# Patient Record
Sex: MALE | Race: WHITE | HISPANIC OR LATINO | Employment: FULL TIME | URBAN - METROPOLITAN AREA
[De-identification: names, ages, dates, MRNs, and addresses within clinical notes are randomized per-mention and may not be internally consistent; named-entity substitution may affect disease eponyms.]

---

## 2017-03-23 ENCOUNTER — HOSPITAL ENCOUNTER (EMERGENCY)
Facility: HOSPITAL | Age: 32
Discharge: HOME | End: 2017-03-24
Attending: EMERGENCY MEDICINE

## 2017-03-23 DIAGNOSIS — R31.9 HEMATURIA: Primary | ICD-10-CM

## 2017-03-23 DIAGNOSIS — N20.0 KIDNEY STONE: ICD-10-CM

## 2017-03-23 LAB
ANION GAP SERPL CALC-SCNC: 12 MMOL/L
BACTERIA #/AREA URNS HPF: ABNORMAL /HPF
BASOPHILS # BLD AUTO: 0.02 K/UL
BASOPHILS NFR BLD: 0.2 %
BILIRUB UR QL STRIP: NEGATIVE
BUN SERPL-MCNC: 14 MG/DL
CALCIUM SERPL-MCNC: 9.7 MG/DL
CHLORIDE SERPL-SCNC: 101 MMOL/L
CLARITY UR: ABNORMAL
CO2 SERPL-SCNC: 24 MMOL/L
COLOR UR: ABNORMAL
CREAT SERPL-MCNC: 1.1 MG/DL
DIFFERENTIAL METHOD: ABNORMAL
EOSINOPHIL # BLD AUTO: 0.3 K/UL
EOSINOPHIL NFR BLD: 4 %
ERYTHROCYTE [DISTWIDTH] IN BLOOD BY AUTOMATED COUNT: 12.5 %
EST. GFR  (AFRICAN AMERICAN): >60 ML/MIN/1.73 M^2
EST. GFR  (NON AFRICAN AMERICAN): >60 ML/MIN/1.73 M^2
GLUCOSE SERPL-MCNC: 132 MG/DL
GLUCOSE UR QL STRIP: NEGATIVE
HCT VFR BLD AUTO: 43.5 %
HGB BLD-MCNC: 15.4 G/DL
HGB UR QL STRIP: ABNORMAL
HYALINE CASTS #/AREA URNS LPF: 0 /LPF
KETONES UR QL STRIP: NEGATIVE
LEUKOCYTE ESTERASE UR QL STRIP: NEGATIVE
LYMPHOCYTES # BLD AUTO: 1.7 K/UL
LYMPHOCYTES NFR BLD: 20.9 %
MCH RBC QN AUTO: 28.7 PG
MCHC RBC AUTO-ENTMCNC: 35.4 %
MCV RBC AUTO: 81 FL
MICROSCOPIC COMMENT: ABNORMAL
MONOCYTES # BLD AUTO: 0.5 K/UL
MONOCYTES NFR BLD: 6.6 %
NEUTROPHILS # BLD AUTO: 5.6 K/UL
NEUTROPHILS NFR BLD: 68.1 %
NITRITE UR QL STRIP: NEGATIVE
PH UR STRIP: 5 [PH] (ref 5–8)
PLATELET # BLD AUTO: 179 K/UL
PMV BLD AUTO: 11.1 FL
POTASSIUM SERPL-SCNC: 4 MMOL/L
PROT UR QL STRIP: ABNORMAL
RBC # BLD AUTO: 5.37 M/UL
RBC #/AREA URNS HPF: >100 /HPF (ref 0–4)
SODIUM SERPL-SCNC: 137 MMOL/L
SP GR UR STRIP: 1.02 (ref 1–1.03)
SQUAMOUS #/AREA URNS HPF: 1 /HPF
URN SPEC COLLECT METH UR: ABNORMAL
UROBILINOGEN UR STRIP-ACNC: NEGATIVE EU/DL
WBC # BLD AUTO: 8.17 K/UL
WBC #/AREA URNS HPF: 2 /HPF (ref 0–5)

## 2017-03-23 PROCEDURE — 81000 URINALYSIS NONAUTO W/SCOPE: CPT

## 2017-03-23 PROCEDURE — 85025 COMPLETE CBC W/AUTO DIFF WBC: CPT

## 2017-03-23 PROCEDURE — 96375 TX/PRO/DX INJ NEW DRUG ADDON: CPT

## 2017-03-23 PROCEDURE — 63600175 PHARM REV CODE 636 W HCPCS: Performed by: EMERGENCY MEDICINE

## 2017-03-23 PROCEDURE — 80048 BASIC METABOLIC PNL TOTAL CA: CPT

## 2017-03-23 PROCEDURE — 96361 HYDRATE IV INFUSION ADD-ON: CPT

## 2017-03-23 PROCEDURE — 25000003 PHARM REV CODE 250: Performed by: EMERGENCY MEDICINE

## 2017-03-23 PROCEDURE — 99285 EMERGENCY DEPT VISIT HI MDM: CPT | Mod: 25

## 2017-03-23 PROCEDURE — 96374 THER/PROPH/DIAG INJ IV PUSH: CPT

## 2017-03-23 PROCEDURE — 87086 URINE CULTURE/COLONY COUNT: CPT

## 2017-03-23 RX ORDER — TAMSULOSIN HYDROCHLORIDE 0.4 MG/1
0.4 CAPSULE ORAL DAILY
Qty: 30 CAPSULE | Refills: 1 | Status: SHIPPED | OUTPATIENT
Start: 2017-03-23 | End: 2018-03-23

## 2017-03-23 RX ORDER — OXYCODONE AND ACETAMINOPHEN 5; 325 MG/1; MG/1
1 TABLET ORAL EVERY 4 HOURS PRN
Qty: 20 TABLET | Refills: 0 | Status: SHIPPED | OUTPATIENT
Start: 2017-03-23

## 2017-03-23 RX ORDER — TAMSULOSIN HYDROCHLORIDE 0.4 MG/1
0.4 CAPSULE ORAL
Status: COMPLETED | OUTPATIENT
Start: 2017-03-23 | End: 2017-03-23

## 2017-03-23 RX ORDER — MORPHINE SULFATE 2 MG/ML
6 INJECTION, SOLUTION INTRAMUSCULAR; INTRAVENOUS
Status: COMPLETED | OUTPATIENT
Start: 2017-03-23 | End: 2017-03-23

## 2017-03-23 RX ORDER — KETOROLAC TROMETHAMINE 30 MG/ML
15 INJECTION, SOLUTION INTRAMUSCULAR; INTRAVENOUS
Status: COMPLETED | OUTPATIENT
Start: 2017-03-23 | End: 2017-03-23

## 2017-03-23 RX ORDER — ONDANSETRON 4 MG/1
4 TABLET, FILM COATED ORAL 3 TIMES DAILY PRN
Qty: 20 TABLET | Refills: 1 | Status: SHIPPED | OUTPATIENT
Start: 2017-03-23

## 2017-03-23 RX ORDER — PHENAZOPYRIDINE HYDROCHLORIDE 100 MG/1
100 TABLET, FILM COATED ORAL
Qty: 20 TABLET | Refills: 0 | Status: SHIPPED | OUTPATIENT
Start: 2017-03-23 | End: 2017-04-02

## 2017-03-23 RX ADMIN — SODIUM CHLORIDE 1000 ML: 0.9 INJECTION, SOLUTION INTRAVENOUS at 09:03

## 2017-03-23 RX ADMIN — MORPHINE SULFATE 6 MG: 2 INJECTION, SOLUTION INTRAMUSCULAR; INTRAVENOUS at 11:03

## 2017-03-23 RX ADMIN — KETOROLAC TROMETHAMINE 15 MG: 30 INJECTION, SOLUTION INTRAMUSCULAR at 09:03

## 2017-03-23 RX ADMIN — TAMSULOSIN HYDROCHLORIDE 0.4 MG: 0.4 CAPSULE ORAL at 11:03

## 2017-03-23 NOTE — ED AVS SNAPSHOT
[facility]  [address]  [place] [address]               Candace Menard   3/23/2017  9:25 PM   ED    Descripción:  Male : 1985   [facility]           Suarez Cuidado fue coordinado por:     Provider Role From To    Berkley Muñiz MD Attending Provider 17 3826 --      Razón de la kassie     Flank Pain           Diagnósticos de Esta Visita        Comentarios    Hematuria    -  Primario     Kidney stone           ED Disposition     ED Disposition Condition Comment    Discharge             Lista de tareas           Información de seguimiento     Realice un seguimiento con:  Giovanni Zee MD    Cómo:  Helio haresh kassie lo antes posible    Especialidad:  Urology    Información de contacto:    [address]  [address]  [place] [address]  [phone]        Recetas para recoger        Disp Refills Start End    oxycodone-acetaminophen (PERCOCET) 5-325 mg per tablet 20 tablet 0 3/23/2017     Take 1 tablet by mouth every 4 (four) hours as needed for Pain. - Oral    ondansetron (ZOFRAN) 4 MG tablet 20 tablet 1 3/23/2017     Take 1 tablet (4 mg total) by mouth 3 (three) times daily as needed for Nausea. - Oral    tamsulosin (FLOMAX) 0.4 mg Cp24 30 capsule 1 3/23/2017 3/23/2018    Take 1 capsule (0.4 mg total) by mouth once daily. - Oral    phenazopyridine (PYRIDIUM) 100 MG tablet 20 tablet 0 3/23/2017 2017    Take 1 tablet (100 mg total) by mouth 3 (three) times daily with meals. - Oral      [facility]     [facility]ermeras - Asistencia   Enfermeras registradas de [facility] pueden ayudarle a reservar haresh kassie, proveer educación para la thomas, asesoría clínica, y otros servicios de asesoramiento.   Llame para roscoe servicio gratuito a [phone].             Medicamentos           Mensaje sobre Medicamentos     Verificar los cambios y / o adiciones a suarez régimen de medicación son los mismos que discutir con  cagle médico. Si cualquiera de estos cambios o adiciones son incorrectos, por favor notifique a cagle proveedor de atención médica.        EMPEZAR a [place] estos medicamentos NUEVOS        Refills    oxycodone-acetaminophen (PERCOCET) 5-325 mg per tablet 0    Sig: Take 1 tablet by mouth every 4 (four) hours as needed for Pain.    Categoría: Print    Vía: Oral    ondansetron (ZOFRAN) 4 MG tablet 1    Sig: Take 1 tablet (4 mg total) by mouth 3 (three) times daily as needed for Nausea.    Categoría: Print    Vía: Oral    tamsulosin (FLOMAX) 0.4 mg Cp24 1    Sig: Take 1 capsule (0.4 mg total) by mouth once daily.    Categoría: Print    Vía: Oral    phenazopyridine (PYRIDIUM) 100 MG tablet 0    Sig: Take 1 tablet (100 mg total) by mouth 3 (three) times daily with meals.    Categoría: Print    Vía: Oral      These medications were administered today        Dose Freq    sodium chloride 0.9% bolus 1,000 mL 1,000 mL ED 1 Time    Sig: Inject 1,000 mLs into the vein ED 1 Time.    Categoría: Normal    Vía: Intravenous    ketorolac injection 15 mg 15 mg ED 1 Time    Sig: Inject 15 mg into the vein ED 1 Time.    Categoría: Normal    Vía: Intravenous    tamsulosin 24 hr capsule 0.4 mg 0.4 mg ED 1 Time    Sig: Take 1 capsule (0.4 mg total) by mouth ED 1 Time.    Categoría: Normal    Vía: Oral    morphine injection 6 mg 6 mg ED 1 Time    Sig: Inject 3 mLs (6 mg total) into the vein ED 1 Time.    Categoría: Normal    Vía: Intravenous           Verifique que la siguiente lista de medicamentos es haresh representación exacta de los medicamentos que está tomando actualmente. Si no hay ningunos reportados, la lista puede estar en johnson. Si no es correcta, por favor póngase en contacto con cagle proveedor de atención médica. Lleve esta lista con usted en julio de emergencia.           Medicamentos Actuales     morphine injection 6 mg Inject 3 mLs (6 mg total) into the vein ED 1 Time.    ondansetron (ZOFRAN) 4 MG tablet Take 1 tablet (4 mg total) by  "mouth 3 (three) times daily as needed for Nausea.    oxycodone-acetaminophen (PERCOCET) 5-325 mg per tablet Take 1 tablet by mouth every 4 (four) hours as needed for Pain.    phenazopyridine (PYRIDIUM) 100 MG tablet Take 1 tablet (100 mg total) by mouth 3 (three) times daily with meals.    tamsulosin (FLOMAX) 0.4 mg Cp24 Take 1 capsule (0.4 mg total) by mouth once daily.    tamsulosin 24 hr capsule 0.4 mg Take 1 capsule (0.4 mg total) by mouth ED 1 Time.           Información de referencia clínica           Saira signos vitales filiberto     PS Pulso Temperatura Resp [place] Peso    141/99 (Patient Position: Sitting) 81 98.3 °F (36.8 °C) (Oral) 20 5' 10" (1.778 m) 72.6 kg (160 lb)    SpO2 [place]                98% 22.96 kg/m2          Alergias     A partir del:  3/23/2017        No Known Allergies      Vacunas     Administradas en la fecha de la visita:  3/23/2017        None      ED Micro, Lab, POCT     Start Ordered       Status Ordering Provider    03/23/17 2144 03/23/17 2144  Basic metabolic panel  STAT      Final result     03/23/17 2144 03/23/17 2144  Urinalysis Clean Catch  STAT      Final result     03/23/17 2144 03/23/17 2144  CBC auto differential  STAT      Final result     03/23/17 2144 03/23/17 2144  Urine culture **CANNOT BE ORDERED STAT**  Once      In process     03/23/17 2144 03/23/17 2144  Urinalysis Microscopic  Once      Final result       ED Imaging Orders     Start Ordered       Status Ordering Provider    03/23/17 2149 03/23/17 2148  CT Renal Stone Study ABD Pelvis WO  1 time imaging      Final result         Instrucciones a igor de tamika         Cálculo Renal [Kidney Stone W/ Colic]    Rosalia maurice retorcijón (cólico [cramping]) que usted siente, así nereida las náuseas (nausea) y el vómito (vomiting) que tiene se deben a haresh pequeña shandra (cálculo [stone]) que se le formó en el riñón y que ahora está pasando por un conducto angosto (el uréter), mora hacia la vejiga. Haresh vez que llegue a la vejiga, el " "dolor se detendrá. Es posible que el cálculo (shandra [stone]) pase entero por el tracto urinario. [El tamaño puede ser de entre 1/16" y 1/4" (1 y 6 mm)]. También puede ser que la shandra se desgrane en fragmentos arenosos de los que usted quizás ni siquiera se dé cuenta.  Cuando se ha tenido un cálculo renal (kidney stone), hay probabilidades de que otro vuelva a formarse en el futuro.  Cuidados En [place]:  1. Stephanie gran cantidad de agua (al menos, entre 8 y 10 vasos al día).  2. La mayoría de los cálculos (piedras [stones]) salen solas, zaire pueden tardar desde algunas horas a algunos días en hacerlo. Hay veces en que la shandra es demasiado amne para salir waldemar y, entonces, hay que recurrir a métodos especiales para quitarla.  3. Cada vez que orine, hágalo en un recipiente (jarra o algo similar). Vierta la orina de dorothy recipiente en el inodoro pasándola por un colador. Hágalo así hasta que hayan pasado 24 horas después de que haya cesado el dolor. Para entonces, si había un cálculo renal (shandra en el riñón [kidney stone]), ya debería price salido de la vejiga. Algunas piedras se disuelven en partículas que parecen arena y atraviesan el colador sin que usted lo note. Si eso sucede, no verá ninguna shandra.  4. Si encuentra haresh shandra en el colador, guárdela y llévesela al médico para que la analice. Hay algunos tipos de shandra cuya formación se puede prevenir. Por lo tanto, es importante saber qué tipo de shandar es la que usted tiene.  5. Intente mantenerse lo más activo posible, dado que eso ayudará a expulsar la shandra. No se quede en la cama a menos que el dolor le impida estar levantado. Quizás note que la orina es de color rojiza, rosada o amarronada. Es normal cuando se está despidiendo un cálculo renal (kidney stone).  Programe haresh VISITA DE CONTROL con cagle médico o regrese a roscoe centro si el dolor dura más de 48 horas.  [NOTA: Si le dumont hecho haresh radiografía (X-ray) o haresh tomografía computarizada (CT scan), " éstas serán evaluadas por un especialista. Le informarán de los nuevos hallazgos que puedan afectar la atención médica que necesita.]  Busque Prontamente Atención Médica  si algo de lo siguiente ocurre:  · El dolor no disminuye con el medicamento que le dieron.  · Vómito persistente o no puede mantener los líquidos en el estómago.  · Debilidad, mareo o desmayo.  · Fiebre de 100.4°F (38°C) o más tamika, o nereida le haya indicado cagle proveedor de atención médica.  · Orina opaca de color rojizo o amarronada (no se puede darian a través de keisha) u orina que tiene muchos coágulos de ariadne (blood clots).  · No ha podido orinar en 8 horas y siente haresh presión en la vejiga que es cada vez mayor.     Date Last Reviewed: 1/10/2014  © 8874-5169 [employer]. [address] [place] [address]. Todos los derechos reservados. Esta información no pretende sustituir la atención médica profesional. Sólo cagle médico puede diagnosticar y tratar un problema de thomas.          Registrarse para [facility]     La activación de cagle cuenta [facility] es tan fácil nereida 1-2-3!    1) Ir a [email or web address], seleccione Registrarse Ahora, meter el código de activación y cagle fecha de nacimiento, y seleccione Próximo.    [id number]  Expires: 5/7/2017 11:00 PM      2) Crear un nombre de usuario y contraseña para usar cuando se visita [facility] en el futuro y selecciona haresh pregunta de seguridad en julio de que pierda cagle contraseña y seleccione Próximo.    3) Introduzca cagle dirección de correo electrónico y cece clic en Registrarse!    Información Adicional  Si tiene alguna pregunta, por favor, e-mail [email or web address] o llame al [phone] para hablar con nuestro personal. Recuerde, [facility] no debe ser usada para necesidades urgentes. En julio de emergencia médica, llame al 911.        Smoking Cessation     Si desea dejar de fumar:  · Usted puede ser elegible para recibir servicios gratuitos si usted es un residente de [place] y  comenzó a fumar cigarrillos antes del 1988. Llame al Smoking Cessation Trust (SCT) a [phone] o [phone].   Llame -QUIT-NOW si usted no cumple con los criterios anteriores.             [facility] cumple con las leyes federales aplicables de derechos civiles y no discrimina por motivos de traci, color, origen nacional, edad, discapacidad, o sexo.        Language Assistance Services     ATTENTION: Language assistance services are available, free of charge. Please call [phone].      ATENCIÓN: Si habla español, tiene a cagle disposición servicios gratuitos de asistencia lingüística. Llame al [phone].     CHÚ Ý: N?u b?n nói Ti?ng Vi?t, có các d?ch v? h? tr? ngôn ng? mi?n phí dành cho b?n. G?i s? [phone].                      [facility]  [address]  [place] [address]               Candace Menard   3/23/2017  9:25 PM   ED    Description:  Male : 1985   Department:  [facility]           Your Care was Coordinated By:     Provider Role From To    Berkley Muñiz MD Attending Provider 17 0024 --      Reason for Visit     Flank Pain           Diagnoses this Visit        Comments    Hematuria    -  Primary     Kidney stone           ED Disposition     ED Disposition Condition Comment    Discharge             To Do List           Follow-up Information     Follow up with Giovanni Zee MD. Schedule an appointment as soon as possible for a visit.    Specialty:  Urology    Contact information:    [address]  [address]  [place] [address] [phone]         These Medications        Disp Refills Start End    oxycodone-acetaminophen (PERCOCET) 5-325 mg per tablet 20 tablet 0 3/23/2017     Take 1 tablet by mouth every 4 (four) hours as needed for Pain. - Oral    ondansetron (ZOFRAN) 4 MG tablet 20 tablet 1 3/23/2017     Take 1 tablet (4 mg total) by mouth 3 (three) times daily as  needed for Nausea. - Oral    tamsulosin (FLOMAX) 0.4 mg Cp24 30 capsule 1 3/23/2017 3/23/2018    Take 1 capsule (0.4 mg total) by mouth once daily. - Oral    phenazopyridine (PYRIDIUM) 100 MG tablet 20 tablet 0 3/23/2017 4/2/2017    Take 1 tablet (100 mg total) by mouth 3 (three) times daily with meals. - Oral      [facility]     [facility] On Call Nurse Care Line - 24/7 Assistance  Registered nurses in the [facility] On Call Center provide clinical advisement, health education, appointment booking, and other advisory services.  Call for this free service at [phone].             Medications           Message regarding Medications     Verify the changes and/or additions to your medication regime listed below are the same as discussed with your clinician today.  If any of these changes or additions are incorrect, please notify your healthcare provider.        START taking these NEW medications        Refills    oxycodone-acetaminophen (PERCOCET) 5-325 mg per tablet 0    Sig: Take 1 tablet by mouth every 4 (four) hours as needed for Pain.    Class: Print    Route: Oral    ondansetron (ZOFRAN) 4 MG tablet 1    Sig: Take 1 tablet (4 mg total) by mouth 3 (three) times daily as needed for Nausea.    Class: Print    Route: Oral    tamsulosin (FLOMAX) 0.4 mg Cp24 1    Sig: Take 1 capsule (0.4 mg total) by mouth once daily.    Class: Print    Route: Oral    phenazopyridine (PYRIDIUM) 100 MG tablet 0    Sig: Take 1 tablet (100 mg total) by mouth 3 (three) times daily with meals.    Class: Print    Route: Oral      These medications were administered today        Dose Freq    sodium chloride 0.9% bolus 1,000 mL 1,000 mL ED 1 Time    Sig: Inject 1,000 mLs into the vein ED 1 Time.    Class: Normal    Route: Intravenous    ketorolac injection 15 mg 15 mg ED 1 Time    Sig: Inject 15 mg into the vein ED 1 Time.    Class: Normal    Route: Intravenous    tamsulosin 24 hr capsule 0.4 mg 0.4 mg ED 1 Time    Sig: Take 1 capsule  "(0.4 mg total) by mouth ED 1 Time.    Class: Normal    Route: Oral    morphine injection 6 mg 6 mg ED 1 Time    Sig: Inject 3 mLs (6 mg total) into the vein ED 1 Time.    Class: Normal    Route: Intravenous           Verify that the below list of medications is an accurate representation of the medications you are currently taking.  If none reported, the list may be blank. If incorrect, please contact your healthcare provider. Carry this list with you in case of emergency.           Current Medications     morphine injection 6 mg Inject 3 mLs (6 mg total) into the vein ED 1 Time.    ondansetron (ZOFRAN) 4 MG tablet Take 1 tablet (4 mg total) by mouth 3 (three) times daily as needed for Nausea.    oxycodone-acetaminophen (PERCOCET) 5-325 mg per tablet Take 1 tablet by mouth every 4 (four) hours as needed for Pain.    phenazopyridine (PYRIDIUM) 100 MG tablet Take 1 tablet (100 mg total) by mouth 3 (three) times daily with meals.    tamsulosin (FLOMAX) 0.4 mg Cp24 Take 1 capsule (0.4 mg total) by mouth once daily.    tamsulosin 24 hr capsule 0.4 mg Take 1 capsule (0.4 mg total) by mouth ED 1 Time.           Clinical Reference Information           Your Vitals Were     BP Pulse Temp Resp Height Weight    141/99 (Patient Position: Sitting) 81 98.3 °F (36.8 °C) (Oral) 20 5' 10" (1.778 m) 72.6 kg (160 lb)    SpO2 BMI                98% 22.96 kg/m2          Allergies as of 3/23/2017     No Known Allergies      Immunizations Administered on Date of Encounter - 3/23/2017     None      ED Micro, Lab, POCT     Start Ordered       Status Ordering Provider    03/23/17 2144 03/23/17 2144  Basic metabolic panel  STAT      Final result     03/23/17 2144 03/23/17 2144  Urinalysis Clean Catch  STAT      Final result     03/23/17 2144 03/23/17 2144  CBC auto differential  STAT      Final result     03/23/17 2144 03/23/17 2144  Urine culture **CANNOT BE ORDERED STAT**  Once      In process     03/23/17 2144 03/23/17 2144  Urinalysis " "Microscopic  Once      Final result       ED Imaging Orders     Start Ordered       Status Ordering Provider    03/23/17 2149 03/23/17 2148  CT Renal Stone Study ABD Pelvis WO  1 time imaging      Final result         Discharge Instructions         Cálculo Renal [Kidney Stone W/ Colic]    Dorothy maurice retorcijón (cólico [cramping]) que usted siente, así nereida las náuseas (nausea) y el vómito (vomiting) que tiene se deben a haresh pequeña shandra (cálculo [stone]) que se le formó en el riñón y que ahora está pasando por un conducto angosto (el uréter), mora hacia la vejiga. Haresh vez que llegue a la vejiga, el dolor se detendrá. Es posible que el cálculo (shandra [stone]) pase entero por el tracto urinario. [El tamaño puede ser de entre 1/16" y 1/4" (1 y 6 mm)]. También puede ser que la shandra se desgrane en fragmentos arenosos de los que usted quizás ni siquiera se dé cuenta.  Cuando se ha tenido un cálculo renal (kidney stone), hay probabilidades de que otro vuelva a formarse en el futuro.  Cuidados En [place]:  1. Stephanie gran cantidad de agua (al menos, entre 8 y 10 vasos al día).  2. La mayoría de los cálculos (piedras [stones]) salen solas, zaire pueden tardar desde algunas horas a algunos días en hacerlo. Hay veces en que la shandra es demasiado mane para salir waldemar y, entonces, hay que recurrir a métodos especiales para quitarla.  3. Cada vez que orine, hágalo en un recipiente (jarra o algo similar). Vierta la orina de dorothy recipiente en el inodoro pasándola por un colador. Hágalo así hasta que hayan pasado 24 horas después de que haya cesado el dolor. Para entonces, si había un cálculo renal (shandra en el riñón [kidney stone]), ya debería price salido de la vejiga. Algunas piedras se disuelven en partículas que parecen arena y atraviesan el colador sin que usted lo note. Si eso sucede, no verá ninguna shandra.  4. Si encuentra haresh shandra en el colador, guárdela y llévesela al médico para que la analice. Hay algunos tipos " de shandra cuya formación se puede prevenir. Por lo tanto, es importante saber qué tipo de shandra es la que usted tiene.  5. Intente mantenerse lo más activo posible, dado que eso ayudará a expulsar la shandra. No se quede en la cama a menos que el dolor le impida estar levantado. Quizás note que la orina es de color rojiza, rosada o amarronada. Es normal cuando se está despidiendo un cálculo renal (kidney stone).  Programe haresh VISITA DE CONTROL con cagle médico o regrese a roscoe centro si el dolor dura más de 48 horas.  [NOTA: Si le dumont hecho haresh radiografía (X-ray) o haresh tomografía computarizada (CT scan), éstas serán evaluadas por un especialista. Le informarán de los nuevos hallazgos que puedan afectar la atención médica que necesita.]  Busque Prontamente Atención Médica  si algo de lo siguiente ocurre:  · El dolor no disminuye con el medicamento que le dieron.  · Vómito persistente o no puede mantener los líquidos en el estómago.  · Debilidad, mareo o desmayo.  · Fiebre de 100.4°F (38°C) o más tamika, o nereida le haya indicado cagle proveedor de atención médica.  · Orina opaca de color rojizo o amarronada (no se puede darian a través de keisha) u orina que tiene muchos coágulos de ariadne (blood clots).  · No ha podido orinar en 8 horas y siente haresh presión en la vejiga que es cada vez mayor.     Date Last Reviewed: 1/10/2014  © 0662-0055 [employer]. [address] [place] [address], [place] [address]. Todos los derechos reservados. Esta información no pretende sustituir la atención médica profesional. Sólo cagle médico puede diagnosticar y tratar un problema de thomas.          [facility] Sign-Up     Activating your [facility] account is as easy as 1-2-3!     1) Visit [email or web address], select Sign Up Now, enter this activation code and your date of birth, then select Next.  [email or web address]  Expires: 5/7/2017 11:00 PM      2) Create a username and password to use when you visit [facility] in the future and select a security  question in case you lose your password and select Next.    3) Enter your e-mail address and click Sign Up!    Additional Information  If you have questions, please e-mail [email or web address] or call [phone] to talk to our [facility] staff. Remember, [facility] is NOT to be used for urgent needs. For medical emergencies, dial 911.         Smoking Cessation     If you would like to quit smoking:   You may be eligible for free services if you are a [place] resident and started smoking cigarettes before September 1, 1988.  Call the Smoking Cessation Trust (SCT) toll free at (239) [phone] or [phone] [phone].   Call 0-693-QUIT-NOW if you do not meet the above criteria.             [facility] complies with applicable Federal civil rights laws and does not discriminate on the basis of race, color, national origin, age, disability, or sex.        Language Assistance Services     ATTENTION: Language assistance services are available, free of charge. Please call [phone].      ATENCIÓN: Si habla español, tiene a cagle disposición servicios gratuitos de asistencia lingüística. Llame al [phone].     CHÚ Ý: N?u b?n nói Ti?ng Vi?t, có các d?ch v? h? tr? ngôn ng? mi?n phí dành cho b?n. G?i s? [phone].

## 2017-03-24 VITALS
HEART RATE: 70 BPM | DIASTOLIC BLOOD PRESSURE: 69 MMHG | TEMPERATURE: 98 F | WEIGHT: 160 LBS | OXYGEN SATURATION: 99 % | HEIGHT: 70 IN | BODY MASS INDEX: 22.9 KG/M2 | SYSTOLIC BLOOD PRESSURE: 121 MMHG | RESPIRATION RATE: 16 BRPM

## 2017-03-24 NOTE — ED NOTES
Patient complaints of lower back pain for 4-5 days which has radiated to right groin area and right flank pain since 4 this afternoon.  Pain has gotten unbearable and they have decided to come to ED.  Patient appears in distress.  No nausea, no vomiting.  Pain is 10/10.

## 2017-03-24 NOTE — ED PROVIDER NOTES
Encounter Date: 3/23/2017       History     Chief Complaint   Patient presents with    Flank Pain     pt to triage and reports sudden right flank pain w/ blood in urine and began yesterday     Review of patient's allergies indicates:  No Known Allergies  HPI Comments: 32-year-old male with no past medical history presents with sudden onset right flank pain with blood in her urine that started yesterday pain started about 4 hours ago.  Aching throbbing radiates to the groin.  8 out of 10.  No associated shortness of breath or chest pain.  He does have some pain down into the right testicle.  He has no history of kidney stones.  No urinary discharge.    Patient is a 32 y.o. male presenting with the following complaint: abdominal pain. The history is provided by the patient. The history is limited by a language barrier. A  was used.   Abdominal Pain   The onset of the illness was abrupt. The abdominal pain is located in the right flank. The abdominal pain radiates to the groin. The abdominal pain is relieved by nothing. The abdominal pain is exacerbated by urination. The other symptoms of the illness include nausea. The other symptoms of the illness do not include fever or vomiting.     History reviewed. No pertinent past medical history.  History reviewed. No pertinent surgical history.  No family history on file.  Social History   Substance Use Topics    Smoking status: None    Smokeless tobacco: None    Alcohol use None     Review of Systems   Constitutional: Negative for fever.   Eyes: Negative.    Respiratory: Negative.    Gastrointestinal: Positive for abdominal pain and nausea. Negative for vomiting.   Genitourinary: Positive for difficulty urinating.   All other systems reviewed and are negative.      Physical Exam   Initial Vitals   BP Pulse Resp Temp SpO2   03/23/17 2121 03/23/17 2121 03/23/17 2121 03/23/17 2121 03/23/17 2121   141/99 81 20 98.3 °F (36.8 °C) 98 %     Physical  Exam    Nursing note and vitals reviewed.  Constitutional: He appears well-developed and well-nourished.   HENT:   Head: Normocephalic.   Eyes: EOM are normal. Pupils are equal, round, and reactive to light.   Neck: Normal range of motion.   Cardiovascular: Normal rate.   Pulmonary/Chest: Breath sounds normal.   Abdominal: Soft. Bowel sounds are normal.   Musculoskeletal: Normal range of motion.   X-ray of left CVA tenderness less right CVA tenderness he does have right lower quadrant abdominal tenderness as well as a little bit of right testicular pain no swelling.   Neurological: He is alert and oriented to person, place, and time.   Skin: Skin is warm and dry.   Psychiatric: He has a normal mood and affect.         ED Course   Procedures  Labs Reviewed   CULTURE, URINE   BASIC METABOLIC PANEL   URINALYSIS   CBC W/ AUTO DIFFERENTIAL             Medical Decision Making:   Initial Assessment:   32-year-old male with sudden onset flank pain, radiation of the groin  Differential Diagnosis:   Nephrolithiasis, appendicitis, urinary tract infection, testicular torsion  Clinical Tests:   Lab Tests: Ordered and Reviewed  Radiological Study: Ordered and Reviewed  ED Management:  Patient was given analgesics, nonsteroidals as well as antiemetics.  He was recommended to follow-up with urology on an outpatient basis.  He was given a prescriptions for Flomax, as well as Percocet and docusate.  He had no evidence of hydronephrosis infection, or renal failure.                   ED Course     Clinical Impression:   The encounter diagnosis was Kidney stone.          Berkley Muñiz MD  03/23/17 [id number]       Berkley Muñiz MD  03/23/17 9186

## 2017-03-24 NOTE — DISCHARGE INSTRUCTIONS
"  Cálculo Renal [Kidney Stone W/ Colic]    Dorothy maurice retorcijón (cólico [cramping]) que usted siente, así nereida las náuseas (nausea) y el vómito (vomiting) que tiene se deben a haresh pequeña shandra (cálculo [stone]) que se le formó en el riñón y que ahora está pasando por un conducto angosto (el uréter), mora hacia la vejiga. Haresh vez que llegue a la vejiga, el dolor se detendrá. Es posible que el cálculo (shandra [stone]) pase entero por el tracto urinario. [El tamaño puede ser de entre 1/16" y 1/4" (1 y 6 mm)]. También puede ser que la shanrda se desgrane en fragmentos arenosos de los que usted quizás ni siquiera se dé cuenta.  Cuando se ha tenido un cálculo renal (kidney stone), hay probabilidades de que otro vuelva a formarse en el futuro.  Cuidados En [place]:  1. Stephanie gran cantidad de agua (al menos, entre 8 y 10 vasos al día).  2. La mayoría de los cálculos (piedras [stones]) salen solas, zaire pueden tardar desde algunas horas a algunos días en hacerlo. Hay veces en que la shandra es demasiado mane para salir waldemar y, entonces, hay que recurrir a métodos especiales para quitarla.  3. Cada vez que orine, hágalo en un recipiente (jarra o algo similar). Vierta la orina de dortohy recipiente en el inodoro pasándola por un colador. Hágalo así hasta que hayan pasado 24 horas después de que haya cesado el dolor. Para entonces, si había un cálculo renal (shandra en el riñón [kidney stone]), ya debería price salido de la vejiga. Algunas piedras se disuelven en partículas que parecen arena y atraviesan el colador sin que usted lo note. Si eso sucede, no verá ninguna shandra.  4. Si encuentra haresh shandra en el colador, guárdela y llévesela al médico para que la analice. Hay algunos tipos de shandra cuya formación se puede prevenir. Por lo tanto, es importante saber qué tipo de shandra es la que usted tiene.  5. Intente mantenerse lo más activo posible, dado que eso ayudará a expulsar la shandra. No se quede en la cama a menos que el " dolor le impida estar levantado. Quizás note que la orina es de color rojiza, rosada o amarronada. Es normal cuando se está despidiendo un cálculo renal (kidney stone).  Programe haresh VISITA DE CONTROL con cagle médico o regrese a roscoe centro si el dolor dura más de 48 horas.  [NOTA: Si le dumont hecho haresh radiografía (X-ray) o haresh tomografía computarizada (CT scan), éstas serán evaluadas por un especialista. Le informarán de los nuevos hallazgos que puedan afectar la atención médica que necesita.]  Busque Prontamente Atención Médica  si algo de lo siguiente ocurre:  · El dolor no disminuye con el medicamento que le dieron.  · Vómito persistente o no puede mantener los líquidos en el estómago.  · Debilidad, mareo o desmayo.  · Fiebre de 100.4°F (38°C) o más tamika, o nereida le haya indicado cagle proveedor de atención médica.  · Orina opaca de color rojizo o amarronada (no se puede darian a través de keisha) u orina que tiene muchos coágulos de ariadne (blood clots).  · No ha podido orinar en 8 horas y siente haresh presión en la vejiga que es cada vez mayor.     Date Last Reviewed: 1/10/2014  © 2053-0151 [employer]. [address] [place] [id number]. Todos los derechos reservados. Esta información no pretende sustituir la atención médica profesional. Sólo cagle médico puede diagnosticar y tratar un problema de thomas.

## 2017-03-24 NOTE — ED NOTES
Patient identifiers verified and correct for Candace Menard.    LOC: The patient is awake, alert and aware of environment with an appropriate affect, the patient is oriented x 3 and speaking appropriately.  APPEARANCE: Patient in acute distress, patient is clean and well groomed, patient's clothing is properly fastened.  SKIN: The skin is warm and dry, color consistent with ethnicity, patient has normal skin turgor and moist mucus membranes, skin intact, no breakdown or bruising noted.  MUSCULOSKELETAL: Patient moving all extremities spontaneously, no obvious swelling or deformities noted. Right lower abdominal pain radiating to right flank and right groin area.  RESPIRATORY: Airway is open and patent, respirations are spontaneous, patient has a normal effort and rate, no accessory muscle use noted, bilateral breath sounds clear.  :  Hematuria.  Right groin pain.  CARDIAC: Patient has a normal rate and regular rhythm, no periphreal edema noted, capillary refill < 3 seconds.  ABDOMEN: Soft and non tender to palpation, no distention noted, normoactive bowel sounds present in all four quadrants.

## 2017-03-25 LAB — BACTERIA UR CULT: NO GROWTH
